# Patient Record
(demographics unavailable — no encounter records)

---

## 2024-12-04 NOTE — REASON FOR VISIT
[Subsequent Evaluation] : a subsequent evaluation for [FreeTextEntry2] : - s/p drainage and washout nasal tip 9/11/23

## 2024-12-04 NOTE — ASSESSMENT
[FreeTextEntry1] : Ms. JARVIS is a 48 year female s/p drainage and washout nasal tip 9/11/23 now with L nasal pain, feels new infection is just starting and has been on Mupirocin. No abscess on exam  - HSV VZV PCR sent today L columella, no sores seen today - c/w Mupirocin ointment twice daily - rx Bactrim sent as rescue antibiotic - would consider taking Bactrim as dental prophylactic with next visit in 6 months
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
## 2025-04-09 NOTE — PHYSICAL EXAM
[Normal] : no abnormal secretions [de-identified] : + erythema and small nasal vestibule abscess on the left

## 2025-04-09 NOTE — PROCEDURE
[FreeTextEntry3] : r/b/a of procedure were explained to the patient.  They elected to proceed with procedure, surgical consent was signed and time out performed.  Chlorhexidine used to clean the area.  1% lidocaine with epinephrine used to inject locally.  After adequate time for anesthesia, 11 blade was used to incise the nasal vestibule on the left over the area of greatest fluctuance.  There was immediate drainage of murky turbid material.  Culture was obtained.  Curved iris scissor was used to explore the cavity, there was a large cavity anterior to the medial crura and over the nasal tip.  Loculations were broken up.  Cavity was irrigated with 10 cc of saline with bacitracin, using an Angiocath.  Bacitracin ointment was placed in the area afterwards.  Follow up appointment was scheduled.
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
## 2025-04-16 NOTE — ASSESSMENT
[FreeTextEntry1] : Chronic nasal abscess s/p incision and drainage last week doing better today. Scope shows some crusting L ant septum, L OMC clear-no evidence of sinus infection - would c/w Mupirocin for another week rx sent - rx Bactrim sent for dental prophylaxis - f/u after her next dental appointment

## 2025-04-16 NOTE — PHYSICAL EXAM
[Normal] : no abnormal secretions [Nasal Endoscopy Performed] : nasal endoscopy was performed, see procedure section for findings [de-identified] : Near resolution of left nasal vestibular abscess

## 2025-04-16 NOTE — PROCEDURE
[de-identified] : reason for exam: anterior rhinoscopy insufficient for symptom evaluation   Fiberoptic nasopharyngoscopy was performed.  R/b/a of procedure was explained to the patient and they agreed to proceed with procedure.  Mild edema anterior nasal cavity left inferior turbinate was normal, left middle turbinate was  normal, left superior turbinate was normal no boggy or erythematous mucosa.  OMC patent on left, superior, inferior and middle meati clear no evidence of purulence or sinusitis.  left sphenoethmoidal recess clear.  No nasal polyps seen.  Septum midline. NP clear no masses or lesions.  No obstruction of choanae, torus clear.  scope #: [id number]
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
## 2025-04-16 NOTE — END OF VISIT
[FreeTextEntry3] : I personally saw and examined NBA LECHUGACO  in detail. I spoke to MEDARDO Varghese regarding the assessment and plan of care. I performed the procedures and relevant physical exam. I have made changes to the body of the note wherever necessary and appropriate.
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
## 2025-05-13 NOTE — ASSESSMENT
[FreeTextEntry1] : Chronic nasal abscess - no infection seen today - can stop the Mupirocin at this point -  Bactrim for dental prophylaxis, she has enough - f/u in December after next cleaning if she does not develop an infection after that cleaning either with the prophylaxis.  Then I will make sure she has enough Bactrim annually to take after dental procedures or after water exposure and she will just follow-up annually.